# Patient Record
Sex: FEMALE | Race: WHITE | NOT HISPANIC OR LATINO
[De-identification: names, ages, dates, MRNs, and addresses within clinical notes are randomized per-mention and may not be internally consistent; named-entity substitution may affect disease eponyms.]

---

## 2018-11-19 PROBLEM — Z00.00 ENCOUNTER FOR PREVENTIVE HEALTH EXAMINATION: Status: ACTIVE | Noted: 2018-11-19

## 2018-12-19 ENCOUNTER — APPOINTMENT (OUTPATIENT)
Dept: OBGYN | Facility: CLINIC | Age: 68
End: 2018-12-19
Payer: MEDICARE

## 2018-12-19 VITALS
BODY MASS INDEX: 24.55 KG/M2 | SYSTOLIC BLOOD PRESSURE: 124 MMHG | DIASTOLIC BLOOD PRESSURE: 76 MMHG | WEIGHT: 162 LBS | HEIGHT: 68 IN

## 2018-12-19 DIAGNOSIS — Z81.1 FAMILY HISTORY OF ALCOHOL ABUSE AND DEPENDENCE: ICD-10-CM

## 2018-12-19 DIAGNOSIS — Z78.9 OTHER SPECIFIED HEALTH STATUS: ICD-10-CM

## 2018-12-19 DIAGNOSIS — R23.2 FLUSHING: ICD-10-CM

## 2018-12-19 DIAGNOSIS — Z63.72 ALCOHOLISM AND DRUG ADDICTION IN FAMILY: ICD-10-CM

## 2018-12-19 DIAGNOSIS — Z80.3 FAMILY HISTORY OF MALIGNANT NEOPLASM OF BREAST: ICD-10-CM

## 2018-12-19 DIAGNOSIS — Z12.4 ENCOUNTER FOR SCREENING FOR MALIGNANT NEOPLASM OF CERVIX: ICD-10-CM

## 2018-12-19 DIAGNOSIS — Z81.8 FAMILY HISTORY OF OTHER MENTAL AND BEHAVIORAL DISORDERS: ICD-10-CM

## 2018-12-19 DIAGNOSIS — Z87.42 PERSONAL HISTORY OF OTHER DISEASES OF THE FEMALE GENITAL TRACT: ICD-10-CM

## 2018-12-19 DIAGNOSIS — R87.629 UNSPECIFIED ABNORMAL CYTOLOGICAL FINDINGS IN SPECIMENS FROM VAGINA: ICD-10-CM

## 2018-12-19 DIAGNOSIS — F19.90 OTHER PSYCHOACTIVE SUBSTANCE USE, UNSPECIFIED, UNCOMPLICATED: ICD-10-CM

## 2018-12-19 DIAGNOSIS — Z87.891 PERSONAL HISTORY OF NICOTINE DEPENDENCE: ICD-10-CM

## 2018-12-19 DIAGNOSIS — B00.2 HERPESVIRAL GINGIVOSTOMATITIS AND PHARYNGOTONSILLITIS: ICD-10-CM

## 2018-12-19 DIAGNOSIS — Z91.89 OTHER SPECIFIED PERSONAL RISK FACTORS, NOT ELSEWHERE CLASSIFIED: ICD-10-CM

## 2018-12-19 DIAGNOSIS — Z82.49 FAMILY HISTORY OF ISCHEMIC HEART DISEASE AND OTHER DISEASES OF THE CIRCULATORY SYSTEM: ICD-10-CM

## 2018-12-19 DIAGNOSIS — Z83.1 FAMILY HISTORY OF OTHER INFECTIOUS AND PARASITIC DISEASES: ICD-10-CM

## 2018-12-19 LAB
BILIRUB UR QL STRIP: NORMAL
COLLECTION METHOD: NORMAL
GLUCOSE UR-MCNC: NORMAL
HCG UR QL: 0.2 EU/DL
HEMOCCULT SP1 STL QL: NEGATIVE
HGB UR QL STRIP.AUTO: NORMAL
KETONES UR-MCNC: ABNORMAL
LEUKOCYTE ESTERASE UR QL STRIP: NORMAL
NITRITE UR QL STRIP: NORMAL
PH UR STRIP: 6
PROT UR STRIP-MCNC: ABNORMAL
SP GR UR STRIP: 1.02

## 2018-12-19 PROCEDURE — 82270 OCCULT BLOOD FECES: CPT

## 2018-12-19 PROCEDURE — 99387 INIT PM E/M NEW PAT 65+ YRS: CPT

## 2018-12-19 PROCEDURE — 81003 URINALYSIS AUTO W/O SCOPE: CPT | Mod: QW

## 2018-12-19 RX ORDER — MULTIVITAMIN
TABLET ORAL
Refills: 0 | Status: ACTIVE | COMMUNITY

## 2018-12-19 RX ORDER — JUNIPER 300 MG
CAPSULE ORAL
Refills: 0 | Status: ACTIVE | COMMUNITY

## 2018-12-19 RX ORDER — ROSUVASTATIN CALCIUM 5 MG/1
TABLET, FILM COATED ORAL
Refills: 0 | Status: ACTIVE | COMMUNITY

## 2018-12-19 RX ORDER — DANDELION ROOT 525 MG
CAPSULE ORAL
Refills: 0 | Status: ACTIVE | COMMUNITY

## 2018-12-26 LAB — CYTOLOGY CVX/VAG DOC THIN PREP: NORMAL

## 2019-05-01 ENCOUNTER — APPOINTMENT (OUTPATIENT)
Dept: OBGYN | Facility: CLINIC | Age: 69
End: 2019-05-01
Payer: MEDICARE

## 2019-05-01 VITALS
DIASTOLIC BLOOD PRESSURE: 80 MMHG | BODY MASS INDEX: 24.55 KG/M2 | HEIGHT: 68 IN | SYSTOLIC BLOOD PRESSURE: 142 MMHG | WEIGHT: 162 LBS

## 2019-05-01 PROCEDURE — 36415 COLL VENOUS BLD VENIPUNCTURE: CPT

## 2019-05-01 PROCEDURE — 99214 OFFICE O/P EST MOD 30 MIN: CPT

## 2019-05-01 NOTE — COUNSELING
[Nutrition] : nutrition [Exercise] : exercise [Vitamins/Supplements] : vitamins/supplements [Lab Results] : lab results

## 2019-05-01 NOTE — CHIEF COMPLAINT
[Urgent Visit] : Urgent Visit [FreeTextEntry1] : C/O SOME VAGINAL ITCHING SINCE LAST VISIT, INTERMITTENT.  PELVIC CRAMPING, "DISCOMFORT" AT SUPRAPUBIC AREA WHICH RESOLVED.  5 DAYS AGO STARTED WITH DYSURIA AT BEGINNING OF URINATION.  SEEN AT PMD TODAY, "URINE WAS CLEAR," BUT SENDING CULTURE.  \par \par NO CHANGE IN VAGINAL DISCHARGE.  NOT S/A SINCE LAST VISIT 3 WEEKS AGO.  NO GROSS HEMATURIA OR LEAKAGE.  FEELS LIKE "SOMETHING JUST INSIDE VAGINA  IS GOING ON."  USING TWICE WEEKLY VAGINAL ESTROGEN CREAM.

## 2019-05-06 LAB — 25(OH)D3 SERPL-MCNC: 49.3 NG/ML

## 2020-05-06 ENCOUNTER — APPOINTMENT (OUTPATIENT)
Dept: OBGYN | Facility: CLINIC | Age: 70
End: 2020-05-06

## 2021-12-29 ENCOUNTER — NON-APPOINTMENT (OUTPATIENT)
Age: 71
End: 2021-12-29

## 2022-03-01 ENCOUNTER — APPOINTMENT (OUTPATIENT)
Dept: OBGYN | Facility: CLINIC | Age: 72
End: 2022-03-01
Payer: MEDICARE

## 2022-03-01 VITALS
SYSTOLIC BLOOD PRESSURE: 120 MMHG | WEIGHT: 171 LBS | DIASTOLIC BLOOD PRESSURE: 70 MMHG | BODY MASS INDEX: 25.91 KG/M2 | HEIGHT: 68 IN

## 2022-03-01 DIAGNOSIS — Z01.419 ENCOUNTER FOR GYNECOLOGICAL EXAMINATION (GENERAL) (ROUTINE) W/OUT ABNORMAL FINDINGS: ICD-10-CM

## 2022-03-01 PROCEDURE — 99397 PER PM REEVAL EST PAT 65+ YR: CPT | Mod: GY

## 2022-03-01 PROCEDURE — G0101: CPT

## 2022-03-01 NOTE — HISTORY OF PRESENT ILLNESS
[Patient reported mammogram was normal] : Patient reported mammogram was normal [Patient reported PAP Smear was normal] : Patient reported PAP Smear was normal [Patient reported bone density results were abnormal] : Patient reported bone density results were abnormal [Patient reported colonoscopy was normal] : Patient reported colonoscopy was normal [Previously active] : previously active [No] : No [FreeTextEntry1] : Results of bone density reviewed with patient.  We discussed diet, exercise, calcium, vitamin D, smoking cessation, family history.  Fall precautions given. Discussed anti-resorptive agents, hormones and hormone agonists and expectant management.\par OSTOEPENIA HAS WORSENED AT HIP.  PATIENT SAYS SHE HAS RESUMED DOING HER YOGA PRACTICE, WAS OCCUPIED AND INACTIVE X SEVERAL YEARS DUE TO 'S ILLNESS.\par \par  DIAGNOSED WITH ALS, AFFECTING UPPER EXTREMITIES.  \par \par DISCUSSED PRECAUTIONS AGAINST COVID19, INCLUDING MASK WEARING, SOCIAL DISTANCING AND HAND WASHING.\par VACCINATED X 3. [Mammogramdate] : 12/2021 [PapSmeardate] : 12/2018 [BoneDensityDate] : 12/2021 [TextBox_37] : OSTEOPENIA WITH LOSS AT HIP [ColonoscopyDate] : 2019 [TextBox_43] : Q 3 YEARS FOR POLYPS [FreeTextEntry2] :  UNABLE

## 2022-03-01 NOTE — PHYSICAL EXAM
[Appropriately responsive] : appropriately responsive [Alert] : alert [No Acute Distress] : no acute distress [Soft] : soft [Non-tender] : non-tender [Non-distended] : non-distended [No HSM] : No HSM [No Lesions] : no lesions [No Mass] : no mass [Oriented x3] : oriented x3 [Examination Of The Breasts] : a normal appearance [No Masses] : no breast masses were palpable [Vulvar Atrophy] : vulvar atrophy [Labia Majora] : normal [Labia Minora] : normal [Atrophy] : atrophy [Normal] : normal [Uterine Adnexae] : normal [No Tenderness] : no tenderness [Nl Sphincter Tone] : normal sphincter tone [FreeTextEntry9] : GUAIAC NEGATIVE

## 2022-03-01 NOTE — PLAN
[FreeTextEntry1] : Results of bone density reviewed with patient.  We discussed diet, exercise, calcium, vitamin D, smoking cessation, family history.  Fall precautions given. Discussed anti-resorptive agents, hormones and hormone agonists and expectant management.\par Resistance bands with links to resistance band exercises given to patient to promote bone health  ([facility] [employer]).\par

## 2022-03-07 LAB — CYTOLOGY CVX/VAG DOC THIN PREP: ABNORMAL

## 2023-03-06 ENCOUNTER — APPOINTMENT (OUTPATIENT)
Dept: OBGYN | Facility: CLINIC | Age: 73
End: 2023-03-06
Payer: MEDICARE

## 2023-03-06 ENCOUNTER — NON-APPOINTMENT (OUTPATIENT)
Age: 73
End: 2023-03-06

## 2023-03-06 VITALS
SYSTOLIC BLOOD PRESSURE: 125 MMHG | HEIGHT: 68 IN | DIASTOLIC BLOOD PRESSURE: 70 MMHG | WEIGHT: 162 LBS | BODY MASS INDEX: 24.55 KG/M2

## 2023-03-06 DIAGNOSIS — E78.00 PURE HYPERCHOLESTEROLEMIA, UNSPECIFIED: ICD-10-CM

## 2023-03-06 DIAGNOSIS — F32.A ANXIETY DISORDER, UNSPECIFIED: ICD-10-CM

## 2023-03-06 DIAGNOSIS — F41.9 ANXIETY DISORDER, UNSPECIFIED: ICD-10-CM

## 2023-03-06 PROCEDURE — 99214 OFFICE O/P EST MOD 30 MIN: CPT

## 2023-03-06 PROCEDURE — G0101: CPT

## 2023-03-06 NOTE — PLAN
[FreeTextEntry1] : CONTINUE D3, EXERCISE, F/U DEXA END OF 2023.  \par REQUEST LABS FROM [facility], F/U VITAMIN D LEVEL AND CHOLESTEROL NUMBERS.\par \par F/U , GROUP SUPPORT FOR CARE OF SPOUSE, STATE FUNDS FOR HOME CARE.\par \par BREAST ASYMMETRY, DIAGNOSTIC MAMMO/SONOGRAM.\par

## 2023-03-06 NOTE — HISTORY OF PRESENT ILLNESS
[TextBox_4] : ANNUAL [Mammogramdate] : 12/21/21 [TextBox_19] : BR 2 [PapSmeardate] : 3/1/22 [TextBox_31] : ATROPHIC [BoneDensityDate] : 12/21/21 [TextBox_37] : OSTEOPENIA [ColonoscopyDate] : 2020 [LMPDate] : 2008 [TextBox_6] : 2008 [FreeTextEntry1] : 2008 [Previously active] : previously active [Men] : men [No] : No [FreeTextEntry2] :

## 2024-04-02 ENCOUNTER — APPOINTMENT (OUTPATIENT)
Dept: OBGYN | Facility: CLINIC | Age: 74
End: 2024-04-02
Payer: MEDICARE

## 2024-04-02 VITALS
BODY MASS INDEX: 23.95 KG/M2 | DIASTOLIC BLOOD PRESSURE: 80 MMHG | SYSTOLIC BLOOD PRESSURE: 140 MMHG | HEIGHT: 68 IN | WEIGHT: 158 LBS

## 2024-04-02 DIAGNOSIS — Z12.11 ENCOUNTER FOR SCREENING FOR MALIGNANT NEOPLASM OF COLON: ICD-10-CM

## 2024-04-02 DIAGNOSIS — M85.80 OTHER SPECIFIED DISORDERS OF BONE DENSITY AND STRUCTURE, UNSPECIFIED SITE: ICD-10-CM

## 2024-04-02 DIAGNOSIS — N64.89 OTHER SPECIFIED DISORDERS OF BREAST: ICD-10-CM

## 2024-04-02 DIAGNOSIS — Z12.39 ENCOUNTER FOR OTHER SCREENING FOR MALIGNANT NEOPLASM OF BREAST: ICD-10-CM

## 2024-04-02 PROCEDURE — 99214 OFFICE O/P EST MOD 30 MIN: CPT

## 2024-04-02 NOTE — HISTORY OF PRESENT ILLNESS
[Mammogramdate] : 12/21/21 [TextBox_19] : br 2 [PapSmeardate] : 3/1/22 [TextBox_31] : atrophic [BoneDensityDate] : 12/21/21 [TextBox_37] : osteopenia  [ColonoscopyDate] : 2020 [LMPDate] : 2008 [TextBox_6] : 2008 [FreeTextEntry1] : 2008 [Previously active] : previously active [Men] : men [No] : No [FreeTextEntry2] :

## 2024-04-02 NOTE — PHYSICAL EXAM
[Appropriately responsive] : appropriately responsive [Alert] : alert [No Acute Distress] : no acute distress [Soft] : soft [Non-tender] : non-tender [Non-distended] : non-distended [No HSM] : No HSM [No Lesions] : no lesions [No Mass] : no mass [Oriented x3] : oriented x3 [Examination Of The Breasts] : a normal appearance [No Masses] : no breast masses were palpable [Vulvar Atrophy] : vulvar atrophy [Labia Majora] : normal [Labia Minora] : normal [Atrophy] : atrophy [Normal] : normal [Uterine Adnexae] : non-palpable [No Tenderness] : no tenderness [Nl Sphincter Tone] : normal sphincter tone [FreeTextEntry9] : GUAIAC NEGATIVE

## 2024-04-02 NOTE — PLAN
[FreeTextEntry1] : F/U DEXA OSTEOPENIA AT PRIMARY CARE. NORMAL BREAST EXAM, SCHEDULE MAMMO WITH SONOGRAM FOR ASYMMETRY IN BREASTS. CERVICAL CANCER SCREENING DISCUSSED, PAP 2025.

## 2024-05-07 ENCOUNTER — OFFICE (OUTPATIENT)
Facility: LOCATION | Age: 74
Setting detail: OPHTHALMOLOGY
End: 2024-05-07
Payer: MEDICARE

## 2024-05-07 ENCOUNTER — RX ONLY (RX ONLY)
Age: 74
End: 2024-05-07

## 2024-05-07 DIAGNOSIS — H00.021: ICD-10-CM

## 2024-05-07 DIAGNOSIS — H02.835: ICD-10-CM

## 2024-05-07 DIAGNOSIS — H02.832: ICD-10-CM

## 2024-05-07 DIAGNOSIS — H02.834: ICD-10-CM

## 2024-05-07 DIAGNOSIS — H02.831: ICD-10-CM

## 2024-05-07 PROCEDURE — 99213 OFFICE O/P EST LOW 20 MIN: CPT | Performed by: OPHTHALMOLOGY

## 2024-05-07 ASSESSMENT — CONFRONTATIONAL VISUAL FIELD TEST (CVF)
OD_FINDINGS: FULL
OS_FINDINGS: FULL

## 2024-05-08 PROBLEM — H02.831 DERMATOCHALASIS; RIGHT UPPER LID, RIGHT LOWER LID, LEFT UPPER LID, LEFT LOWER LID: Status: ACTIVE | Noted: 2024-05-07

## 2024-05-08 PROBLEM — H02.834 DERMATOCHALASIS; RIGHT UPPER LID, RIGHT LOWER LID, LEFT UPPER LID, LEFT LOWER LID: Status: ACTIVE | Noted: 2024-05-07

## 2024-05-08 PROBLEM — H02.832 DERMATOCHALASIS; RIGHT UPPER LID, RIGHT LOWER LID, LEFT UPPER LID, LEFT LOWER LID: Status: ACTIVE | Noted: 2024-05-07

## 2024-05-08 PROBLEM — H02.835 DERMATOCHALASIS; RIGHT UPPER LID, RIGHT LOWER LID, LEFT UPPER LID, LEFT LOWER LID: Status: ACTIVE | Noted: 2024-05-07

## 2024-05-08 ASSESSMENT — LID POSITION - DERMATOCHALASIS
OD_DERMATOCHALASIS: T
OS_DERMATOCHALASIS: T

## 2025-01-29 ENCOUNTER — OFFICE (OUTPATIENT)
Facility: LOCATION | Age: 75
Setting detail: OPHTHALMOLOGY
End: 2025-01-29
Payer: MEDICARE

## 2025-01-29 DIAGNOSIS — H01.004: ICD-10-CM

## 2025-01-29 DIAGNOSIS — H01.002: ICD-10-CM

## 2025-01-29 DIAGNOSIS — H35.443: ICD-10-CM

## 2025-01-29 DIAGNOSIS — H01.005: ICD-10-CM

## 2025-01-29 DIAGNOSIS — H01.001: ICD-10-CM

## 2025-01-29 DIAGNOSIS — Z13.5: ICD-10-CM

## 2025-01-29 PROBLEM — H02.831 DERMATOCHALASIS; RIGHT UPPER LID, RIGHT LOWER LID, LEFT UPPER LID, LEFT LOWER LID: Status: ACTIVE | Noted: 2025-01-29

## 2025-01-29 PROBLEM — H43.393 VITREOUS FLOATERS; BOTH EYES: Status: ACTIVE | Noted: 2025-01-29

## 2025-01-29 PROBLEM — H16.223 DRY EYE SYNDROME K SICCA; BOTH EYES: Status: ACTIVE | Noted: 2025-01-29

## 2025-01-29 PROBLEM — H02.835 DERMATOCHALASIS; RIGHT UPPER LID, RIGHT LOWER LID, LEFT UPPER LID, LEFT LOWER LID: Status: ACTIVE | Noted: 2025-01-29

## 2025-01-29 PROBLEM — H02.834 DERMATOCHALASIS; RIGHT UPPER LID, RIGHT LOWER LID, LEFT UPPER LID, LEFT LOWER LID: Status: ACTIVE | Noted: 2025-01-29

## 2025-01-29 PROBLEM — H02.832 DERMATOCHALASIS; RIGHT UPPER LID, RIGHT LOWER LID, LEFT UPPER LID, LEFT LOWER LID: Status: ACTIVE | Noted: 2025-01-29

## 2025-01-29 PROCEDURE — 92014 COMPRE OPH EXAM EST PT 1/>: CPT | Performed by: OPHTHALMOLOGY

## 2025-01-29 PROCEDURE — 92250 FUNDUS PHOTOGRAPHY W/I&R: CPT | Performed by: OPHTHALMOLOGY

## 2025-01-29 ASSESSMENT — SUPERFICIAL PUNCTATE KERATITIS (SPK)
OD_SPK: T
OS_SPK: T

## 2025-01-29 ASSESSMENT — TONOMETRY
OS_IOP_MMHG: 20
OD_IOP_MMHG: 20

## 2025-01-29 ASSESSMENT — CONFRONTATIONAL VISUAL FIELD TEST (CVF)
OD_FINDINGS: FULL
OS_FINDINGS: FULL

## 2025-01-29 ASSESSMENT — LID EXAM ASSESSMENTS
OS_BLEPHARITIS: LLL LUL T
OD_BLEPHARITIS: RLL RUL T

## 2025-01-29 ASSESSMENT — LID POSITION - DERMATOCHALASIS
OD_DERMATOCHALASIS: T
OS_DERMATOCHALASIS: T

## 2025-01-30 PROBLEM — Z13.5 ENCOUNTER FOR SCREENING FOR EYE AND EAR DISORDERS: Status: ACTIVE | Noted: 2025-01-29

## 2025-01-30 PROBLEM — H31.29 PERIPAPILLARY ATROPHY ; BOTH EYES: Status: ACTIVE | Noted: 2025-01-29

## 2025-01-30 PROBLEM — Z96.1 PSEUDOPHAKIA ; BOTH EYES: Status: ACTIVE | Noted: 2025-01-29

## 2025-01-30 ASSESSMENT — REFRACTION_CURRENTRX
OD_ADD: +2.50
OS_SPHERE: +0.75
OS_AXIS: 146
OD_AXIS: 107
OD_CYLINDER: +0.25
OS_ADD: +2.50
OS_CYLINDER: +1.00
OD_OVR_VA: 20/
OS_OVR_VA: 20/
OD_SPHERE: +0.25

## 2025-01-30 ASSESSMENT — VISUAL ACUITY
OD_BCVA: 20/25
OS_BCVA: 20/25

## 2025-01-30 ASSESSMENT — REFRACTION_AUTOREFRACTION
OS_CYLINDER: +1.25
OD_SPHERE: -0.50
OD_AXIS: 020
OS_SPHERE: -0.50
OS_AXIS: 143
OD_CYLINDER: +0.50

## 2025-05-16 ENCOUNTER — APPOINTMENT (OUTPATIENT)
Dept: OBGYN | Facility: CLINIC | Age: 75
End: 2025-05-16

## 2025-05-16 VITALS
HEIGHT: 68 IN | BODY MASS INDEX: 22.73 KG/M2 | DIASTOLIC BLOOD PRESSURE: 78 MMHG | SYSTOLIC BLOOD PRESSURE: 126 MMHG | WEIGHT: 150 LBS

## 2025-05-16 DIAGNOSIS — Z12.39 ENCOUNTER FOR OTHER SCREENING FOR MALIGNANT NEOPLASM OF BREAST: ICD-10-CM

## 2025-05-16 DIAGNOSIS — Z01.419 ENCOUNTER FOR GYNECOLOGICAL EXAMINATION (GENERAL) (ROUTINE) W/OUT ABNORMAL FINDINGS: ICD-10-CM

## 2025-05-16 PROCEDURE — G0101: CPT

## 2025-05-16 PROCEDURE — 99397 PER PM REEVAL EST PAT 65+ YR: CPT | Mod: GY

## 2025-05-16 PROCEDURE — 99459 PELVIC EXAMINATION: CPT

## 2025-05-27 ENCOUNTER — RESULT REVIEW (OUTPATIENT)
Age: 75
End: 2025-05-27